# Patient Record
Sex: MALE | Race: WHITE | NOT HISPANIC OR LATINO | Employment: FULL TIME | URBAN - METROPOLITAN AREA
[De-identification: names, ages, dates, MRNs, and addresses within clinical notes are randomized per-mention and may not be internally consistent; named-entity substitution may affect disease eponyms.]

---

## 2024-03-19 NOTE — PROGRESS NOTES
"Subjective   Reason for Visit: Santy Gibson is an 33 y.o. male here for a CPE     Chief Complaint   Patient presents with    Annual Exam     Cpe ANNUAL BW TO CHECK CHLOSTEROL        HPI  Santy is a 32 yo male presenting today for Annual CPE   LOV FEB 2023    Occupation: works FT at [employer]   and has 3 young children     Pt reports doing well today  States he would like to have cholesterol rechecked although he has not made any significant changes from last year     Pt followed by dermatology, JARAD Gómez annually  Has had mult bx----> benign     Pt c/o feeling tired often  Denies snoring  Wakes few times/week @ 4 am to urinate   Would like to have labs checked    Do you take any herbs or supplements that were not prescribed by a doctor? Denies   Colon cancer screening: N/A  PSA: N/A   Fasting blood work: Due   Last eye exam: 10/2023  Last dental Exam: 1/2024  Exercise: Not regularly   Mood: no concerns   Sleep: no concerns     Allergies   Allergen Reactions    Amoxicillin Rash       Patient Care Team:  ELEN Matt as PCP - General  [facility]     Review of Systems  ROS was completed and all systems are negative with the exception of what was noted in the the HPI.       Objective   Vitals:  /74   Pulse 71   Ht 1.854 m (6' 1\")   Wt 78.9 kg (174 lb)   SpO2 97%   BMI 22.96 kg/m²       Current Outpatient Medications   Medication Instructions    cetirizine (ZYRTEC) 10 mg, oral, Daily         Physical Exam  Vitals reviewed.   HENT:      Right Ear: Tympanic membrane normal.      Left Ear: Tympanic membrane normal.      Mouth/Throat:      Mouth: Mucous membranes are moist.   Eyes:      Conjunctiva/sclera: Conjunctivae normal.   Neck:        Comments: B/L swollen lymph nodes   Cardiovascular:      Pulses: Normal pulses.      Heart sounds: Normal heart sounds.   Pulmonary:      Effort: Pulmonary effort is normal.      Breath sounds: Normal breath " sounds.   Abdominal:      General: Bowel sounds are normal.   Skin:     General: Skin is warm and dry.   Neurological:      Mental Status: He is alert and oriented to person, place, and time.         Assessment/Plan   Problem List Items Addressed This Visit             ICD-10-CM    Annual physical exam - Primary Z00.00    Hyperlipidemia LDL goal <100 E78.5    Relevant Orders    Lipid Panel    Seasonal allergies J30.2     Takes Zyrtec as needed           Other Visit Diagnoses         Codes    Other fatigue     R53.83    Relevant Orders    Vitamin D 25-Hydroxy,Total (for eval of Vitamin D levels)    Vitamin B12    CBC and Auto Differential

## 2024-03-20 ENCOUNTER — OFFICE VISIT (OUTPATIENT)
Dept: PRIMARY CARE | Facility: CLINIC | Age: 34
End: 2024-03-20
Payer: COMMERCIAL

## 2024-03-20 VITALS
DIASTOLIC BLOOD PRESSURE: 74 MMHG | HEART RATE: 71 BPM | SYSTOLIC BLOOD PRESSURE: 116 MMHG | OXYGEN SATURATION: 97 % | HEIGHT: 73 IN | BODY MASS INDEX: 23.06 KG/M2 | WEIGHT: 174 LBS

## 2024-03-20 DIAGNOSIS — J30.2 SEASONAL ALLERGIES: ICD-10-CM

## 2024-03-20 DIAGNOSIS — R53.83 OTHER FATIGUE: ICD-10-CM

## 2024-03-20 DIAGNOSIS — E78.5 HYPERLIPIDEMIA LDL GOAL <100: ICD-10-CM

## 2024-03-20 DIAGNOSIS — Z00.00 ANNUAL PHYSICAL EXAM: Primary | ICD-10-CM

## 2024-03-20 PROBLEM — D21.9 BENIGN NEOPLASM OF SOFT TISSUE: Status: ACTIVE | Noted: 2024-03-20

## 2024-03-20 PROBLEM — D18.01 HEMANGIOMA OF SKIN AND SUBCUTANEOUS TISSUE: Status: ACTIVE | Noted: 2022-08-31

## 2024-03-20 PROBLEM — L90.5 SCAR CONDITION AND FIBROSIS OF SKIN: Status: ACTIVE | Noted: 2022-08-31

## 2024-03-20 PROBLEM — D22.21 MELANOCYTIC NEVI OF RIGHT EAR AND EXTERNAL AURICULAR CANAL: Status: ACTIVE | Noted: 2022-08-31

## 2024-03-20 PROCEDURE — 1036F TOBACCO NON-USER: CPT | Performed by: NURSE PRACTITIONER

## 2024-03-20 PROCEDURE — 99395 PREV VISIT EST AGE 18-39: CPT | Performed by: NURSE PRACTITIONER

## 2024-03-20 RX ORDER — CETIRIZINE HYDROCHLORIDE 10 MG/1
10 TABLET ORAL DAILY
COMMUNITY
Start: 2019-10-21

## 2024-03-20 ASSESSMENT — PATIENT HEALTH QUESTIONNAIRE - PHQ9
SUM OF ALL RESPONSES TO PHQ9 QUESTIONS 1 AND 2: 0
2. FEELING DOWN, DEPRESSED OR HOPELESS: NOT AT ALL
1. LITTLE INTEREST OR PLEASURE IN DOING THINGS: NOT AT ALL

## 2024-03-20 NOTE — PATIENT INSTRUCTIONS
Thank you for seeing me today.  It was a pleasure to see you again!    Today we did your Annual Physical Exam and discussed the following:     #FATIGUE  Check labs  Exercise regularly  Drink at least 64 oz water daily    Vaccines are important! Please reconsider a flu shot and the Covid-19 vaccine  - Yearly seasonal flu shots are recommended, high dose is indicated for patient over 65.   - Tetanus boosters should be given every 10 yrs, this also covers for diphtheria and pertussis.   - most insurances cover the 2-shot series for shingles (shingrix) after the age of 50.   - Pneumonia vaccines are given routinely at age 65, however if you have a chronic heart or lung diagnosis this may be given before age 65.     Preventative cancer screens SAVE LIVES!  - Prostate cancer screening is included in blood work in men over 40 every 2-4 years, unless risk high  - Colon cancer screening is recommended at age 50 for all patients, sometimes sooner based on family history.   - other tests may be recommended if you are a smoker!     150 minutes of aerobic exercise is advised for good cardiovascular health and to maintain a healthy weight.   Please try to work on maintaining a healthy body weight, with a BMI close to or at a BMI 19-25.    I recommend a whole-foods, plant-based diet, filled with fresh fruits, vegetables, seeds, beans, nuts and berries.    Discussed smoking cessation/Abstinence is best.     Abstaining from the use of alcohol is best. However if you choose to drink current guidelines are 2 or less drinks per day for men and 1.5 or less per day for women (and not all saved for one night on the weekend).    RTC ANNUALLY AND AS NEEDED

## 2024-05-16 ENCOUNTER — OFFICE VISIT (OUTPATIENT)
Dept: DERMATOLOGY | Facility: CLINIC | Age: 34
End: 2024-05-16
Payer: COMMERCIAL

## 2024-05-16 DIAGNOSIS — D22.60 MELANOCYTIC NEVI OF UNSPECIFIED UPPER LIMB, INCLUDING SHOULDER: ICD-10-CM

## 2024-05-16 DIAGNOSIS — D23.9 DERMATOFIBROMA: ICD-10-CM

## 2024-05-16 DIAGNOSIS — D18.01 HEMANGIOMA OF SKIN: ICD-10-CM

## 2024-05-16 DIAGNOSIS — Z86.018 HISTORY OF DYSPLASTIC NEVUS: ICD-10-CM

## 2024-05-16 DIAGNOSIS — D22.71 MELANOCYTIC NEVI OF RIGHT LOWER LIMB, INCLUDING HIP: ICD-10-CM

## 2024-05-16 DIAGNOSIS — Z12.83 ENCOUNTER FOR SCREENING FOR MALIGNANT NEOPLASM OF SKIN: Primary | ICD-10-CM

## 2024-05-16 DIAGNOSIS — L82.1 SEBORRHEIC KERATOSIS: ICD-10-CM

## 2024-05-16 DIAGNOSIS — L57.8 PHOTOAGING OF SKIN: ICD-10-CM

## 2024-05-16 DIAGNOSIS — D22.5 MELANOCYTIC NEVI OF TRUNK: ICD-10-CM

## 2024-05-16 DIAGNOSIS — L72.0 MILIA: ICD-10-CM

## 2024-05-16 PROCEDURE — 99203 OFFICE O/P NEW LOW 30 MIN: CPT | Performed by: DERMATOLOGY

## 2024-05-16 PROCEDURE — 1036F TOBACCO NON-USER: CPT | Performed by: DERMATOLOGY

## 2024-05-16 ASSESSMENT — PATIENT GLOBAL ASSESSMENT (PGA): PATIENT GLOBAL ASSESSMENT: PATIENT GLOBAL ASSESSMENT:  2 - MILD

## 2024-05-16 ASSESSMENT — DERMATOLOGY PATIENT ASSESSMENT
HAVE YOU HAD OR DO YOU HAVE A STAPH INFECTION: NO
DO YOU USE SUNSCREEN: OCCASIONALLY
ARE YOU AN ORGAN TRANSPLANT RECIPIENT: NO
FOR PATIENTS COMING IN FOR A FOLLOW-UP VISIT - HAVE THERE BEEN ANY CHANGES IN YOUR HEALTH SINCE YOUR LAST VISIT: NO
HAVE YOU HAD OR DO YOU HAVE VASCULAR DISEASE: NO
DO YOU USE A TANNING BED: NO
DO YOU HAVE ANY NEW OR CHANGING LESIONS: YES

## 2024-05-16 ASSESSMENT — ITCH NUMERIC RATING SCALE: HOW SEVERE IS YOUR ITCHING?: 0

## 2024-05-16 ASSESSMENT — DERMATOLOGY QUALITY OF LIFE (QOL) ASSESSMENT
RATE HOW BOTHERED YOU ARE BY SYMPTOMS OF YOUR SKIN PROBLEM (EG, ITCHING, STINGING BURNING, HURTING OR SKIN IRRITATION): 0 - NEVER BOTHERED
ARE THERE EXCLUSIONS OR EXCEPTIONS FOR THE QUALITY OF LIFE ASSESSMENT: NO
DATE THE QUALITY-OF-LIFE ASSESSMENT WAS COMPLETED: 66976
WHAT SINGLE SKIN CONDITION LISTED BELOW IS THE PATIENT ANSWERING THE QUALITY-OF-LIFE ASSESSMENT QUESTIONS ABOUT: NONE OF THE ABOVE
RATE HOW BOTHERED YOU ARE BY EFFECTS OF YOUR SKIN PROBLEMS ON YOUR ACTIVITIES (EG, GOING OUT, ACCOMPLISHING WHAT YOU WANT, WORK ACTIVITIES OR YOUR RELATIONSHIPS WITH OTHERS): 0 - NEVER BOTHERED
RATE HOW EMOTIONALLY BOTHERED YOU ARE BY YOUR SKIN PROBLEM (FOR EXAMPLE, WORRY, EMBARRASSMENT, FRUSTRATION): 0 - NEVER BOTHERED

## 2024-05-16 NOTE — PROGRESS NOTES
Richard Gibson is a 33 y.o. male who presents for the following: Skin Check (Pt here for FBSE with hx of Sev. DN. Pt reports areas of concern on face and bilateral hands. ).     Review of Systems:  No other skin or systemic complaints other than what is documented elsewhere in the note.    The following portions of the chart were reviewed this encounter and updated as appropriate:         Skin Cancer History  No skin cancer on file.      Specialty Problems          Dermatology Problems    Hemangioma of skin and subcutaneous tissue    Melanocytic nevi of right ear and external auricular canal    Scar condition and fibrosis of skin        Objective   Well appearing patient in no apparent distress; mood and affect are within normal limits.    A full examination was performed including scalp, head, eyes, ears, nose, lips, neck, chest, axillae, abdomen, back, buttocks, bilateral upper extremities, bilateral lower extremities, hands, feet, fingers, toes, fingernails, and toenails. All findings within normal limits unless otherwise noted below.    Assessment/Plan   1. Encounter for screening for malignant neoplasm of skin  No suspicious lesions noted on examination today    The risk of chronic, cumulative sun damage and risk of development of skin cancer was reviewed today.   The importance of sun protection was reviewed: including the use of a broad spectrum sunscreen that protects against both UVA/UVB rays, with ingredients such as Zinc oxide or titanium dioxide, wearing sun protective clothing and sun avoidance. We reviewed the warning signs of non-melanoma skin cancer and ABCDEs of melanoma  Please follow up should you notice any new or changing pre-existing skin lesion.    2. History of dysplastic nevus  Mid Back  Well healed scar at site of prior treatment without evidence of recurrence.      Dysplastic nevi (moles) are atypical moles that clinically and microscopically appear different from benign,  common mole. They can be described by a pathologist as mild, moderately or severely atypical.  They are not pre-cancerous.  Treatment of these lesions vary. Mildly atypical moles are typically not treated; moderately atypical moles are observed or at times depending on the pathology report or recurrence noted clinically can be treated with shave or wide local excision to be sure it is adequately removed and there are no other abnormalities in the surrounding tissue.  Severely atypical moles are nearly always treated with a excision.  Having multiple atypical moles are a marker of risk of potentially developing melanoma somewhere on the body at some point during a patient's lifetime.   If you have had atypical moles you should be seen semi-annually or annually for full body skin examinations.  Full body photography of your moles may be recommended.  Most importantly the need for sun protection is extremely important.  Being sure to wear a broad spectrum sunscreen that protects against both UVA + UVB rays of at least SPF 30 daily on sun exposed skin, when outside for more than 2 hours be sure to reapply. Sun protective (UPF) clothing is beneficial and a broad brimmed hat rather than a ball cap is often more effective. The combination of sunscreen, sun avoidance and sun protective clothing is best.      3. Photoaging of skin  Mottled pigmentation with telangiectasias and brown reticular macules in sun exposed areas of the body.    The risk of chronic, cumulative sun damage and risk of development of skin cancer was reviewed today.   The importance of sun protection was reviewed: including the use of a broad spectrum sunscreen that protects against both UVA/UVB rays, with ingredients such as Zinc oxide or titanium dioxide, wearing sun protective clothing and sun avoidance. We reviewed the warning signs of non-melanoma skin cancer and ABCDEs of melanoma  Please follow up should you notice any new or changing pre-existing  skin lesion.    4. Hemangioma of skin  Cherry red papules    The benign nature of these skin lesions were reviewed, no treatment is necessary.   Please follow up for any new or pre-existing lesion that is changing in size, shape, color, becomes painful, tender, itches or bleed.    5. Milia  Left Buccal Cheek  Small 1-2 mm white papules.    The benign nature of these skin lesions were reviewed, no treatment is necessary.   Please follow up for any new or pre-existing lesion that is changing in size, shape, color, becomes painful, tender, itches or bleed.      6. Seborrheic keratosis (2)  Left Dorsal Hand, Right Dorsal Hand  Brown, garcia waxy macules    The benign nature of these skin lesions reviewed, reassure provided and no further treatment needed at this time.   These lesions can be removed, if symptomatic (itching, bleeding, rubbing on clothing, painful), otherwise removal is considered cosmetic.     7. Dermatofibroma  Right Proximal Thumb  Firm pink papule with hyperpigmented halo, +dimple sign    Benign, scar tissue like growth that most frequently is due to bug bite or ingrown hair. No treatment is necessary.    8. Melanocytic nevi of unspecified upper limb, including shoulder (2)  Left Arm, Right Arm  Scattered, uniform and benign-appearing, regular brown melanocytic papules and macules.    Clinically benign appearing nevi, no treatment is necessary.  The importance of sun protection was reviewed: including the use of a broad spectrum sunscreen that protects against both UVA/UVB rays, with ingredients such as Zinc oxide or titanium dioxide, wearing sun protective clothing and sun avoidance.   ABCDEs of melanoma reviewed.  Please follow up should you notice any new or changing pre-existing skin lesion.    9. Melanocytic nevi of trunk (3)  Abdomen (Lower Torso, Anterior), Chest (Upper Torso, Anterior), Torso - Posterior (Back)  Tan-brown symmetric macules and papules    Clinically benign appearing nevi, no  treatment is necessary.  The importance of sun protection was reviewed: including the use of a broad spectrum sunscreen that protects against both UVA/UVB rays, with ingredients such as Zinc oxide or titanium dioxide, wearing sun protective clothing and sun avoidance.   ABCDEs of melanoma reviewed.  Please follow up should you notice any new or changing pre-existing skin lesion.    10. Melanocytic nevi of right lower limb, including hip  Right Medial Plantar Surface  Scattered, uniform and benign-appearing, regular brown melanocytic papules and macules.    Clinically benign appearing nevi, no treatment is necessary.  The importance of sun protection was reviewed: including the use of a broad spectrum sunscreen that protects against both UVA/UVB rays, with ingredients such as Zinc oxide or titanium dioxide, wearing sun protective clothing and sun avoidance.   ABCDEs of melanoma reviewed.  Please follow up should you notice any new or changing pre-existing skin lesion.    Follow up in 1 year for FBSE

## 2024-10-03 ENCOUNTER — LAB (OUTPATIENT)
Dept: LAB | Facility: LAB | Age: 34
End: 2024-10-03
Payer: COMMERCIAL

## 2024-10-03 ENCOUNTER — OFFICE VISIT (OUTPATIENT)
Dept: URGENT CARE | Age: 34
End: 2024-10-03
Payer: COMMERCIAL

## 2024-10-03 VITALS
SYSTOLIC BLOOD PRESSURE: 119 MMHG | TEMPERATURE: 97.9 F | HEART RATE: 85 BPM | BODY MASS INDEX: 24.41 KG/M2 | WEIGHT: 185 LBS | DIASTOLIC BLOOD PRESSURE: 82 MMHG | OXYGEN SATURATION: 97 % | RESPIRATION RATE: 16 BRPM

## 2024-10-03 DIAGNOSIS — R60.0 SWELLING OF BOTH PAROTID GLANDS: Primary | ICD-10-CM

## 2024-10-03 DIAGNOSIS — E78.5 HYPERLIPIDEMIA LDL GOAL <100: ICD-10-CM

## 2024-10-03 DIAGNOSIS — R53.83 OTHER FATIGUE: ICD-10-CM

## 2024-10-03 LAB
25(OH)D3 SERPL-MCNC: 23 NG/ML (ref 30–100)
BASOPHILS # BLD AUTO: 0.06 X10*3/UL (ref 0–0.1)
BASOPHILS NFR BLD AUTO: 1.1 %
CHOLEST SERPL-MCNC: 233 MG/DL (ref 0–199)
CHOLESTEROL/HDL RATIO: 4.9
EOSINOPHIL # BLD AUTO: 0.34 X10*3/UL (ref 0–0.7)
EOSINOPHIL NFR BLD AUTO: 6 %
ERYTHROCYTE [DISTWIDTH] IN BLOOD BY AUTOMATED COUNT: 12.5 % (ref 11.5–14.5)
HCT VFR BLD AUTO: 45.6 % (ref 41–52)
HDLC SERPL-MCNC: 47.9 MG/DL
HGB BLD-MCNC: 15.3 G/DL (ref 13.5–17.5)
IMM GRANULOCYTES # BLD AUTO: 0.02 X10*3/UL (ref 0–0.7)
IMM GRANULOCYTES NFR BLD AUTO: 0.4 % (ref 0–0.9)
LDLC SERPL CALC-MCNC: 158 MG/DL
LYMPHOCYTES # BLD AUTO: 1.84 X10*3/UL (ref 1.2–4.8)
LYMPHOCYTES NFR BLD AUTO: 32.5 %
MCH RBC QN AUTO: 28.9 PG (ref 26–34)
MCHC RBC AUTO-ENTMCNC: 33.6 G/DL (ref 32–36)
MCV RBC AUTO: 86 FL (ref 80–100)
MONOCYTES # BLD AUTO: 0.55 X10*3/UL (ref 0.1–1)
MONOCYTES NFR BLD AUTO: 9.7 %
NEUTROPHILS # BLD AUTO: 2.85 X10*3/UL (ref 1.2–7.7)
NEUTROPHILS NFR BLD AUTO: 50.3 %
NON HDL CHOLESTEROL: 185 MG/DL (ref 0–149)
NRBC BLD-RTO: 0 /100 WBCS (ref 0–0)
PLATELET # BLD AUTO: 269 X10*3/UL (ref 150–450)
RBC # BLD AUTO: 5.29 X10*6/UL (ref 4.5–5.9)
TRIGL SERPL-MCNC: 135 MG/DL (ref 0–149)
VIT B12 SERPL-MCNC: 379 PG/ML (ref 211–911)
VLDL: 27 MG/DL (ref 0–40)
WBC # BLD AUTO: 5.7 X10*3/UL (ref 4.4–11.3)

## 2024-10-03 PROCEDURE — 36415 COLL VENOUS BLD VENIPUNCTURE: CPT

## 2024-10-03 PROCEDURE — 85025 COMPLETE CBC W/AUTO DIFF WBC: CPT

## 2024-10-03 PROCEDURE — 80061 LIPID PANEL: CPT

## 2024-10-03 PROCEDURE — 82306 VITAMIN D 25 HYDROXY: CPT

## 2024-10-03 PROCEDURE — 99203 OFFICE O/P NEW LOW 30 MIN: CPT | Performed by: REGISTERED NURSE

## 2024-10-03 PROCEDURE — 82607 VITAMIN B-12: CPT

## 2024-10-03 ASSESSMENT — ENCOUNTER SYMPTOMS
NECK PAIN: 1
SWOLLEN GLANDS: 1
SORE THROAT: 1

## 2024-10-07 ASSESSMENT — ENCOUNTER SYMPTOMS
SINUS PAIN: 0
SINUS PRESSURE: 0
SWOLLEN GLANDS: 1
FEVER: 0
RHINORRHEA: 0
SORE THROAT: 1
NECK PAIN: 1
NUMBNESS: 1

## 2024-10-07 NOTE — PROGRESS NOTES
Subjective   Patient ID: Santy Gibson is a 34 y.o. male. They present today with a chief complaint of Swollen Glands and tingling right side of face (Symptoms off and on for awhile patient states. ).    History of Present Illness    Sore Throat   This is a recurrent problem. The current episode started more than 1 month ago. The problem has been waxing and waning. The pain is worse on the right side. There has been no fever. The pain is at a severity of 3/10. The pain is mild. Associated symptoms include neck pain and swollen glands. He has had no exposure to strep or mono.       Past Medical History  Allergies as of 10/03/2024 - Reviewed 10/03/2024   Allergen Reaction Noted    Amoxicillin Rash 10/21/2019       (Not in a hospital admission)       Past Medical History:   Diagnosis Date    Personal history of other diseases of the musculoskeletal system and connective tissue 01/29/2019    History of back pain    Unspecified acute conjunctivitis, bilateral 12/11/2018    Acute bacterial conjunctivitis of both eyes    Unspecified injury of right wrist, hand and finger(s), initial encounter 12/09/2021    Thumb injury, right, initial encounter       No past surgical history on file.     reports that he has never smoked. He has never used smokeless tobacco. He reports current alcohol use. He reports that he does not use drugs.    Review of Systems  Review of Systems   Constitutional:  Negative for fever.   HENT:  Positive for sore throat. Negative for postnasal drip, rhinorrhea, sinus pressure and sinus pain.    Musculoskeletal:  Positive for neck pain.   Neurological:  Positive for numbness.        Numbness and tingling to right side of face/jawline                                  Objective    Vitals:    10/03/24 1120   BP: 119/82   BP Location: Left arm   Pulse: 85   Resp: 16   Temp: 36.6 °C (97.9 °F)   SpO2: 97%   Weight: 83.9 kg (185 lb)     No LMP for male patient.    Physical Exam    Procedures    Point of Care  Test & Imaging Results from this visit  No results found for this visit on 10/03/24.   No results found.    Diagnostic study results (if any) were reviewed by Crystal L Severino, APRN-CNP.    Assessment/Plan   Allergies, medications, history, and pertinent labs/EKGs/Imaging reviewed by Crystal L Severino, APRN-CNP.     Medical Decision Making  At time of discharge patient was clinically well-appearing and HDS for outpatient management. The patient and/or family was educated regarding diagnosis, supportive care, OTC and Rx medications. The patient and/or family was given the opportunity to ask questions prior to discharge.  They verbalized understanding of my discussion of the plans for treatment, expected course, indications to return to  or seek further evaluation in ED, and the need for timely follow up as directed.   They were provided with a work/school excuse if requested.      Orders and Diagnoses  There are no diagnoses linked to this encounter.    Medical Admin Record      Patient disposition: Home    Electronically signed by Crystal L Severino, APRN-CNP  4:24 PM

## 2024-10-09 NOTE — PROGRESS NOTES
"Subjective   Chief Complaint   Patient presents with    Results     Patient coming in today to discuss his lab results. Patient is concerned specifically about his cholesterol. Pain right neck and jaw.        Patient ID: Santy Gibson is a 34 y.o. male who presents for Results (Patient coming in today to discuss his lab results. Patient is concerned specifically about his cholesterol. Pain right neck and jaw. ).    HPI  Santy is a 35 yo male presenting today to review blood test results     Pt had CPE in March, labs ordered, pt just completed them last week    Pt states the RIGHT side of his jaw/throat have been bothering him for months, kind of \"tingly\"   He went to  last week and was told he was fine  He does have a canker sore in that area  Denies fever/chills, weight loss, fatigue   No pain w/swallowing     Allergies   Allergen Reactions    Amoxicillin Rash       Review of Systems  ROS was completed and all systems are negative with the exception of what was noted in the the HPI.       Objective   /75   Pulse 74   Ht 1.854 m (6' 1\")   Wt 86.8 kg (191 lb 6.4 oz)   SpO2 96%   BMI 25.25 kg/m²      Current Outpatient Medications   Medication Instructions    cetirizine (ZYRTEC) 10 mg, oral, Daily     Lab on 10/03/2024   Component Date Value Ref Range Status    Cholesterol 10/03/2024 233 (H)  0 - 199 mg/dL Final          Age      Desirable   Borderline High   High     0-19 Y     0 - 169       170 - 199     >/= 200    20-24 Y     0 - 189       190 - 224     >/= 225         >24 Y     0 - 199       200 - 239     >/= 240   **All ranges are based on fasting samples. Specific   therapeutic targets will vary based on patient-specific   cardiac risk.    Pediatric guidelines reference:Pediatrics 2011, 128(S5).Adult guidelines reference: NCEP ATPIII Guidelines,LANE 2001, 258:2486-97    Venipuncture immediately after or during the administration of Metamizole may lead to falsely low results. Testing should be " performed immediately prior to Metamizole dosing.    HDL-Cholesterol 10/03/2024 47.9  mg/dL Final      Age       Very Low   Low     Normal    High    0-19 Y    < 35      < 40     40-45     ----  20-24 Y    ----     < 40      >45      ----        >24 Y      ----     < 40     40-60      >60      Cholesterol/HDL Ratio 10/03/2024 4.9   Final      Ref Values  Desirable  < 3.4  High Risk  > 5.0    LDL Calculated 10/03/2024 158 (H)  <=99 mg/dL Final                                Near   Borderline      AGE      Desirable  Optimal    High     High     Very High     0-19 Y     0 - 109     ---    110-129   >/= 130     ----    20-24 Y     0 - 119     ---    120-159   >/= 160     ----      >24 Y     0 -  99   100-129  130-159   160-189     >/=190      VLDL 10/03/2024 27  0 - 40 mg/dL Final    Triglycerides 10/03/2024 135  0 - 149 mg/dL Final       Age         Desirable   Borderline High   High     Very High   0 D-90 D    19 - 174         ----         ----        ----  91 D- 9 Y     0 -  74        75 -  99     >/= 100      ----    10-19 Y     0 -  89        90 - 129     >/= 130      ----    20-24 Y     0 - 114       115 - 149     >/= 150      ----         >24 Y     0 - 149       150 - 199    200- 499    >/= 500    Venipuncture immediately after or during the administration of Metamizole may lead to falsely low results. Testing should be performed immediately prior to Metamizole dosing.    Non HDL Cholesterol 10/03/2024 185 (H)  0 - 149 mg/dL Final          Age       Desirable   Borderline High   High     Very High     0-19 Y     0 - 119       120 - 144     >/= 145    >/= 160    20-24 Y     0 - 149       150 - 189     >/= 190      ----         >24 Y    30 mg/dL above LDL Cholesterol goal      Vitamin D, 25-Hydroxy, Total 10/03/2024 23 (L)  30 - 100 ng/mL Final    Vitamin B12 10/03/2024 379  211 - 911 pg/mL Final    WBC 10/03/2024 5.7  4.4 - 11.3 x10*3/uL Final    nRBC 10/03/2024 0.0  0.0 - 0.0 /100 WBCs Final    RBC 10/03/2024  5.29  4.50 - 5.90 x10*6/uL Final    Hemoglobin 10/03/2024 15.3  13.5 - 17.5 g/dL Final    Hematocrit 10/03/2024 45.6  41.0 - 52.0 % Final    MCV 10/03/2024 86  80 - 100 fL Final    MCH 10/03/2024 28.9  26.0 - 34.0 pg Final    MCHC 10/03/2024 33.6  32.0 - 36.0 g/dL Final    RDW 10/03/2024 12.5  11.5 - 14.5 % Final    Platelets 10/03/2024 269  150 - 450 x10*3/uL Final    Neutrophils % 10/03/2024 50.3  40.0 - 80.0 % Final    Immature Granulocytes %, Automated 10/03/2024 0.4  0.0 - 0.9 % Final    Immature Granulocyte Count (IG) includes promyelocytes, myelocytes and metamyelocytes but does not include bands. Percent differential counts (%) should be interpreted in the context of the absolute cell counts (cells/UL).    Lymphocytes % 10/03/2024 32.5  13.0 - 44.0 % Final    Monocytes % 10/03/2024 9.7  2.0 - 10.0 % Final    Eosinophils % 10/03/2024 6.0  0.0 - 6.0 % Final    Basophils % 10/03/2024 1.1  0.0 - 2.0 % Final    Neutrophils Absolute 10/03/2024 2.85  1.20 - 7.70 x10*3/uL Final    Percent differential counts (%) should be interpreted in the context of the absolute cell counts (cells/uL).    Immature Granulocytes Absolute, Au* 10/03/2024 0.02  0.00 - 0.70 x10*3/uL Final    Lymphocytes Absolute 10/03/2024 1.84  1.20 - 4.80 x10*3/uL Final    Monocytes Absolute 10/03/2024 0.55  0.10 - 1.00 x10*3/uL Final    Eosinophils Absolute 10/03/2024 0.34  0.00 - 0.70 x10*3/uL Final    Basophils Absolute 10/03/2024 0.06  0.00 - 0.10 x10*3/uL Final         Physical Exam  Vitals reviewed.   HENT:      Mouth/Throat:      Mouth: Mucous membranes are moist.      Tongue: No lesions.      Palate: No lesions.      Pharynx: No pharyngeal swelling, oropharyngeal exudate or posterior oropharyngeal erythema.      Tonsils: No tonsillar exudate or tonsillar abscesses. 0 on the right. 0 on the left.   Neurological:      Mental Status: He is alert.         Assessment & Plan  Encounter to discuss test results  Lab results reviewed during OV today         Hyperlipidemia LDL goal <100  Your LDLs (bad cholesterol) were higher than normal.    Please try to exercise regularly: at least 150 minutes/week  Cut back on foods high in trans fat and saturated fats, like red meats, creams, cheese, and butter  Limit sweets and salt   Our goal is to have your LDL's < 70         Vitamin D deficiency  Begin Vitamin D3 [id number] once daily

## 2024-10-10 ENCOUNTER — APPOINTMENT (OUTPATIENT)
Dept: PRIMARY CARE | Facility: CLINIC | Age: 34
End: 2024-10-10
Payer: COMMERCIAL

## 2024-10-10 VITALS
DIASTOLIC BLOOD PRESSURE: 75 MMHG | HEIGHT: 73 IN | OXYGEN SATURATION: 96 % | BODY MASS INDEX: 25.37 KG/M2 | SYSTOLIC BLOOD PRESSURE: 119 MMHG | WEIGHT: 191.4 LBS | HEART RATE: 74 BPM

## 2024-10-10 DIAGNOSIS — E55.9 VITAMIN D DEFICIENCY: ICD-10-CM

## 2024-10-10 DIAGNOSIS — E78.5 HYPERLIPIDEMIA LDL GOAL <100: ICD-10-CM

## 2024-10-10 DIAGNOSIS — Z71.2 ENCOUNTER TO DISCUSS TEST RESULTS: Primary | ICD-10-CM

## 2024-10-10 PROCEDURE — 1036F TOBACCO NON-USER: CPT | Performed by: NURSE PRACTITIONER

## 2024-10-10 PROCEDURE — 99213 OFFICE O/P EST LOW 20 MIN: CPT | Performed by: NURSE PRACTITIONER

## 2024-10-10 PROCEDURE — 3008F BODY MASS INDEX DOCD: CPT | Performed by: NURSE PRACTITIONER

## 2024-10-10 NOTE — ASSESSMENT & PLAN NOTE
Your LDLs (bad cholesterol) were higher than normal.    Please try to exercise regularly: at least 150 minutes/week  Cut back on foods high in trans fat and saturated fats, like red meats, creams, cheese, and butter  Limit sweets and salt   Our goal is to have your LDL's < 70

## 2024-10-10 NOTE — PATIENT INSTRUCTIONS
Thank you for seeing me today Santy Gibson, it was a pleasure to see you again!    Your LDLs (bad cholesterol) were higher than normal.    Please try to exercise regularly: at least 150 minutes/week  Cut back on foods high in trans fat and saturated fats, like red meats, creams, cheese, and butter  Limit sweets and salt   Our goal is to have your LDL's < 70    Begin 1571-9274 Vitamin D3 once daily     For assistance with scheduling referrals or consultations, please call [phone] or [phone].    For laboratory, radiology, and other tests, please call [phone] ([phone] for pediatrics).   If you do not get results within 7-10 days, or you have any questions or concerns, please send a message, call the office ([phone]), or return to the office for a follow-up appointment.     For acute/sick visits, if you are unable to get an office visit, you can do a  On Demand Virtual Visit that is accessible via your My Chart account.  For emergencies, call 9-1-1 or go to the nearest Emergency Department.     Please schedule additional appointment(s) to address concern(s) not addressed today.    Please review prescription inserts and published information for possible adverse effects of all medications.     In general, results are discussed over the phone or via  [email or web address].     You can see your health information, review clinical summaries from office visits & test results online when you follow your health with MY  Chart, a personal health record.   To sign up go to [email or web address].   If you need assistance with signing up or trouble getting into your account call [email or web address] Patient Line 24/7 at [phone]     RTC AS NEEDED     Have a nice day!  Fátima Aviles

## 2025-06-26 ENCOUNTER — APPOINTMENT (OUTPATIENT)
Dept: PRIMARY CARE | Facility: CLINIC | Age: 35
End: 2025-06-26
Payer: COMMERCIAL

## 2025-06-26 NOTE — PROGRESS NOTES
Subjective   Reason for Visit: Santy Gibson is an 34 y.o. male here for a CPE     No chief complaint on file.      HPI  Santy is a 35 yo est male presenting today for Annual CPE   LOV: Oct 2024     Dx: ALLERGIES     #CPE   Occupation:     Do you take any herbs or supplements that were not prescribed by a doctor?     Sexually active:  # Partners:  STI screening:     Fasting blood work:   HIV/HEP C Screening:   Last eye exam:  Last dental Exam:   Exercise:  Mood:  Sleep:   Vaccines:     Health Maintenance Due   Topic Date Due    HIV Screening  Never done    MMR Vaccines (1 of 1 - Standard series) Never done    Varicella Vaccines (1 of 2 - 13+ 2-dose series) Never done    Hepatitis C Screening  Never done    Hepatitis B Vaccines (1 of 3 - 19+ 3-dose series) Never done    COVID-19 Vaccine (5 - 2024-25 season) 09/01/2024    DTaP/Tdap/Td Vaccines (2 - Td or Tdap) 01/04/2025    Yearly Adult Physical  03/21/2025    Skin Cancer Screening  05/16/2025       RX Allergies[1]    No visits with results within 60 Day(s) from this visit.   Latest known visit with results is:   Lab on 10/03/2024   Component Date Value Ref Range Status    Cholesterol 10/03/2024 233 (H)  0 - 199 mg/dL Final          Age      Desirable   Borderline High   High     0-19 Y     0 - 169       170 - 199     >/= 200    20-24 Y     0 - 189       190 - 224     >/= 225         >24 Y     0 - 199       200 - 239     >/= 240   **All ranges are based on fasting samples. Specific   therapeutic targets will vary based on patient-specific   cardiac risk.    Pediatric guidelines reference:Pediatrics 2011, 128(S5).Adult guidelines reference: NCEP ATPIII Guidelines,LANE 2001, 258:2486-97    Venipuncture immediately after or during the administration of Metamizole may lead to falsely low results. Testing should be performed immediately prior to Metamizole dosing.    HDL-Cholesterol 10/03/2024 47.9  mg/dL Final      Age       Very Low   Low     Normal    High    0-19 Y     < 35      < 40     40-45     ----  20-24 Y    ----     < 40      >45      ----        >24 Y      ----     < 40     40-60      >60      Cholesterol/HDL Ratio 10/03/2024 4.9   Final      Ref Values  Desirable  < 3.4  High Risk  > 5.0    LDL Calculated 10/03/2024 158 (H)  <=99 mg/dL Final                                Near   Borderline      AGE      Desirable  Optimal    High     High     Very High     0-19 Y     0 - 109     ---    110-129   >/= 130     ----    20-24 Y     0 - 119     ---    120-159   >/= 160     ----      >24 Y     0 -  99   100-129  130-159   160-189     >/=190      VLDL 10/03/2024 27  0 - 40 mg/dL Final    Triglycerides 10/03/2024 135  0 - 149 mg/dL Final       Age         Desirable   Borderline High   High     Very High   0 D-90 D    19 - 174         ----         ----        ----  91 D- 9 Y     0 -  74        75 -  99     >/= 100      ----    10-19 Y     0 -  89        90 - 129     >/= 130      ----    20-24 Y     0 - 114       115 - 149     >/= 150      ----         >24 Y     0 - 149       150 - 199    200- 499    >/= 500    Venipuncture immediately after or during the administration of Metamizole may lead to falsely low results. Testing should be performed immediately prior to Metamizole dosing.    Non HDL Cholesterol 10/03/2024 185 (H)  0 - 149 mg/dL Final          Age       Desirable   Borderline High   High     Very High     0-19 Y     0 - 119       120 - 144     >/= 145    >/= 160    20-24 Y     0 - 149       150 - 189     >/= 190      ----         >24 Y    30 mg/dL above LDL Cholesterol goal      Vitamin D, 25-Hydroxy, Total 10/03/2024 23 (L)  30 - 100 ng/mL Final    Vitamin B12 10/03/2024 379  211 - 911 pg/mL Final    WBC 10/03/2024 5.7  4.4 - 11.3 x10*3/uL Final    nRBC 10/03/2024 0.0  0.0 - 0.0 /100 WBCs Final    RBC 10/03/2024 5.29  4.50 - 5.90 x10*6/uL Final    Hemoglobin 10/03/2024 15.3  13.5 - 17.5 g/dL Final    Hematocrit 10/03/2024 45.6  41.0 - 52.0 % Final    MCV 10/03/2024 86   80 - 100 fL Final    MCH 10/03/2024 28.9  26.0 - 34.0 pg Final    MCHC 10/03/2024 33.6  32.0 - 36.0 g/dL Final    RDW 10/03/2024 12.5  11.5 - 14.5 % Final    Platelets 10/03/2024 269  150 - 450 x10*3/uL Final    Neutrophils % 10/03/2024 50.3  40.0 - 80.0 % Final    Immature Granulocytes %, Automated 10/03/2024 0.4  0.0 - 0.9 % Final    Immature Granulocyte Count (IG) includes promyelocytes, myelocytes and metamyelocytes but does not include bands. Percent differential counts (%) should be interpreted in the context of the absolute cell counts (cells/UL).    Lymphocytes % 10/03/2024 32.5  13.0 - 44.0 % Final    Monocytes % 10/03/2024 9.7  2.0 - 10.0 % Final    Eosinophils % 10/03/2024 6.0  0.0 - 6.0 % Final    Basophils % 10/03/2024 1.1  0.0 - 2.0 % Final    Neutrophils Absolute 10/03/2024 2.85  1.20 - 7.70 x10*3/uL Final    Percent differential counts (%) should be interpreted in the context of the absolute cell counts (cells/uL).    Immature Granulocytes Absolute, Au* 10/03/2024 0.02  0.00 - 0.70 x10*3/uL Final    Lymphocytes Absolute 10/03/2024 1.84  1.20 - 4.80 x10*3/uL Final    Monocytes Absolute 10/03/2024 0.55  0.10 - 1.00 x10*3/uL Final    Eosinophils Absolute 10/03/2024 0.34  0.00 - 0.70 x10*3/uL Final    Basophils Absolute 10/03/2024 0.06  0.00 - 0.10 x10*3/uL Final         Patient Care Team:  ELEN Matt as PCP - General  [facility]     Review of Systems  ROS was completed and all systems are negative with the exception of what was noted in the the HPI.       Objective     Last Recorded Vitals:  There were no vitals taken for this visit.      Surgical History[2]    Current Outpatient Medications   Medication Instructions    cetirizine (ZYRTEC) 10 mg, oral, Daily         Physical Exam      Assessment & Plan                  [1]   Allergies  Allergen Reactions    Amoxicillin Rash   [2]   Past Surgical History:  Procedure Laterality Date    HERNIA REPAIR       SKIN BIOPSY        vaccination    Orders:    Tdap vaccine, age 7 years and older  (BOOSTRIX)                  [1]   Allergies  Allergen Reactions    Amoxicillin Rash   [2]   Past Surgical History:  Procedure Laterality Date    HERNIA REPAIR      SKIN BIOPSY

## 2025-08-08 ENCOUNTER — APPOINTMENT (OUTPATIENT)
Dept: PRIMARY CARE | Facility: CLINIC | Age: 35
End: 2025-08-08
Payer: COMMERCIAL

## 2025-08-08 VITALS
BODY MASS INDEX: 25.13 KG/M2 | DIASTOLIC BLOOD PRESSURE: 72 MMHG | SYSTOLIC BLOOD PRESSURE: 118 MMHG | WEIGHT: 189.6 LBS | OXYGEN SATURATION: 97 % | HEIGHT: 73 IN | HEART RATE: 77 BPM

## 2025-08-08 DIAGNOSIS — J30.2 SEASONAL ALLERGIES: ICD-10-CM

## 2025-08-08 DIAGNOSIS — E55.9 VITAMIN D DEFICIENCY: ICD-10-CM

## 2025-08-08 DIAGNOSIS — Z00.00 ANNUAL PHYSICAL EXAM: Primary | ICD-10-CM

## 2025-08-08 DIAGNOSIS — E78.5 HYPERLIPIDEMIA LDL GOAL <100: ICD-10-CM

## 2025-08-08 DIAGNOSIS — Z23 NEED FOR VACCINATION: ICD-10-CM

## 2025-08-08 DIAGNOSIS — Z11.4 SCREENING FOR HUMAN IMMUNODEFICIENCY VIRUS: ICD-10-CM

## 2025-08-08 DIAGNOSIS — Z11.59 NEED FOR HEPATITIS C SCREENING TEST: ICD-10-CM

## 2025-08-08 PROCEDURE — 1036F TOBACCO NON-USER: CPT | Performed by: NURSE PRACTITIONER

## 2025-08-08 PROCEDURE — 3008F BODY MASS INDEX DOCD: CPT | Performed by: NURSE PRACTITIONER

## 2025-08-08 PROCEDURE — 99395 PREV VISIT EST AGE 18-39: CPT | Performed by: NURSE PRACTITIONER

## 2025-08-08 PROCEDURE — 90471 IMMUNIZATION ADMIN: CPT | Performed by: NURSE PRACTITIONER

## 2025-08-08 PROCEDURE — 90715 TDAP VACCINE 7 YRS/> IM: CPT | Performed by: NURSE PRACTITIONER

## 2025-08-08 RX ORDER — CHOLECALCIFEROL (VITAMIN D3) 50 MCG
50 TABLET ORAL DAILY
COMMUNITY

## 2025-08-08 ASSESSMENT — PATIENT HEALTH QUESTIONNAIRE - PHQ9
1. LITTLE INTEREST OR PLEASURE IN DOING THINGS: NOT AT ALL
2. FEELING DOWN, DEPRESSED OR HOPELESS: NOT AT ALL
SUM OF ALL RESPONSES TO PHQ9 QUESTIONS 1 AND 2: 0

## 2025-08-08 NOTE — ASSESSMENT & PLAN NOTE
- Counseled on healthy diet and regular exercise  - Fall avoidance information provided  - Personalized prevention plan provided   - Vaccines UTD   - FBW  ordered   - Pt agreeable to HIV/HEP C screening tests     Orders:    Basic Metabolic Panel; Future

## 2025-08-08 NOTE — ASSESSMENT & PLAN NOTE
Begin Vitamin D3 [id number] once daily      Orders:    Vitamin D 25-Hydroxy,Total (for eval of Vitamin D levels); Future

## 2025-08-08 NOTE — PATIENT INSTRUCTIONS
Thank you for seeing me today Santy Gibson, it was a pleasure to see you again!    Today we did your Annual Physical  Exam and discussed the following:     Continue allergy medication and Vitamin D     Lab work ordered today.  Please have your blood drawn in the next 1-2 weeks.  You need to be FASTING for 12 hours prior to blood draw.  You may only have water.  Please contact your insurance company to ask about the best location to get blood drawn.  We will contact you with the results of your blood work and any necessary adjustments  to your plan of care, if you do not hear from us within 3-5 days of having your blood drawn, please call the office at [phone].      For assistance with scheduling referrals or consultations, please call [phone] or [phone].    For laboratory, radiology, and other tests, please call [phone] ([phone] for pediatrics).   For laboratory locations, please visit [email or web address]   If you do not get results within 7-10 days, or you have any questions or concerns, please send a message, call the office ([phone]), or return to the office for a follow-up appointment.     For acute/sick visits, if you are unable to get an office visit, you can do a  On Demand Virtual Visit that is accessible via your My Chart account.  For emergencies, call 9-1-1 or go to the nearest Emergency Department.     Please schedule additional appointment(s) to address concern(s) not addressed today.    Please review prescription inserts and published information for possible adverse effects of all medications.     In general, results are discussed over the phone or via  [email or web address]     You can see your health information, review clinical summaries from office visits & test results online when you follow your health with MY  Chart, a personal health record.   To sign up go to [email or web address].   If you need assistance with signing up  or trouble getting into your account call [facility] Patient Line 24/7 at [phone]     RTC ANNUALLY AND AS NEEDED     ~Fátima Aviles NP

## 2025-08-08 NOTE — ASSESSMENT & PLAN NOTE
Your LDLs (bad cholesterol) were higher than normal.    Please try to exercise regularly: at least 150 minutes/week  Cut back on foods high in trans fat and saturated fats, like red meats, creams, cheese, and butter  Limit sweets and salt   Our goal is to have your LDL's < 70      Orders:    Lipid Panel; Future

## 2026-08-05 ENCOUNTER — APPOINTMENT (OUTPATIENT)
Dept: PRIMARY CARE | Facility: CLINIC | Age: 36
End: 2026-08-05
Payer: COMMERCIAL